# Patient Record
Sex: FEMALE | Race: WHITE
[De-identification: names, ages, dates, MRNs, and addresses within clinical notes are randomized per-mention and may not be internally consistent; named-entity substitution may affect disease eponyms.]

---

## 2017-03-03 ENCOUNTER — HOSPITAL ENCOUNTER (EMERGENCY)
Dept: HOSPITAL 62 - ER | Age: 72
Discharge: HOME | End: 2017-03-03
Payer: MEDICARE

## 2017-03-03 VITALS — DIASTOLIC BLOOD PRESSURE: 60 MMHG | SYSTOLIC BLOOD PRESSURE: 125 MMHG

## 2017-03-03 DIAGNOSIS — R74.8: ICD-10-CM

## 2017-03-03 DIAGNOSIS — Z90.710: ICD-10-CM

## 2017-03-03 DIAGNOSIS — R10.13: ICD-10-CM

## 2017-03-03 DIAGNOSIS — K08.9: ICD-10-CM

## 2017-03-03 DIAGNOSIS — K21.9: ICD-10-CM

## 2017-03-03 DIAGNOSIS — Z96.659: ICD-10-CM

## 2017-03-03 DIAGNOSIS — Z88.6: ICD-10-CM

## 2017-03-03 DIAGNOSIS — Z90.49: ICD-10-CM

## 2017-03-03 DIAGNOSIS — E11.9: ICD-10-CM

## 2017-03-03 DIAGNOSIS — R06.00: Primary | ICD-10-CM

## 2017-03-03 DIAGNOSIS — I10: ICD-10-CM

## 2017-03-03 LAB
ALBUMIN SERPL-MCNC: 4.5 G/DL (ref 3.5–5)
ALP SERPL-CCNC: 85 U/L (ref 38–126)
ALT SERPL-CCNC: 98 U/L (ref 9–52)
ANION GAP SERPL CALC-SCNC: 13 MMOL/L (ref 5–19)
APPEARANCE UR: (no result)
AST SERPL-CCNC: 219 U/L (ref 14–36)
BASOPHILS # BLD AUTO: 0 10^3/UL (ref 0–0.2)
BASOPHILS NFR BLD AUTO: 0.7 % (ref 0–2)
BILIRUB DIRECT SERPL-MCNC: 0 MG/DL (ref 0–0.3)
BILIRUB SERPL-MCNC: 1 MG/DL (ref 0.2–1.3)
BILIRUB UR QL STRIP: NEGATIVE
BUN SERPL-MCNC: 27 MG/DL (ref 7–20)
CALCIUM: 9.7 MG/DL (ref 8.4–10.2)
CHLORIDE SERPL-SCNC: 103 MMOL/L (ref 98–107)
CK MB SERPL-MCNC: 1.56 NG/ML (ref ?–4.55)
CK SERPL-CCNC: 74 U/L (ref 30–135)
CO2 SERPL-SCNC: 26 MMOL/L (ref 22–30)
CREAT SERPL-MCNC: 1.09 MG/DL (ref 0.52–1.25)
EOSINOPHIL # BLD AUTO: 0.1 10^3/UL (ref 0–0.6)
EOSINOPHIL NFR BLD AUTO: 1 % (ref 0–6)
ERYTHROCYTE [DISTWIDTH] IN BLOOD BY AUTOMATED COUNT: 13.4 % (ref 11.5–14)
GLUCOSE SERPL-MCNC: 204 MG/DL (ref 75–110)
GLUCOSE UR STRIP-MCNC: NEGATIVE MG/DL
HCT VFR BLD CALC: 42 % (ref 36–47)
HGB BLD-MCNC: 14 G/DL (ref 12–15.5)
HGB HCT DIFFERENCE: 0
KETONES UR STRIP-MCNC: NEGATIVE MG/DL
LYMPHOCYTES # BLD AUTO: 2.4 10^3/UL (ref 0.5–4.7)
LYMPHOCYTES NFR BLD AUTO: 33.2 % (ref 13–45)
MCH RBC QN AUTO: 30.9 PG (ref 27–33.4)
MCHC RBC AUTO-ENTMCNC: 33.3 G/DL (ref 32–36)
MCV RBC AUTO: 93 FL (ref 80–97)
MONOCYTES # BLD AUTO: 0.5 10^3/UL (ref 0.1–1.4)
MONOCYTES NFR BLD AUTO: 7.1 % (ref 3–13)
NEUTROPHILS # BLD AUTO: 4.2 10^3/UL (ref 1.7–8.2)
NEUTS SEG NFR BLD AUTO: 58 % (ref 42–78)
NITRITE UR QL STRIP: NEGATIVE
PH UR STRIP: 5 [PH] (ref 5–9)
POTASSIUM SERPL-SCNC: 4 MMOL/L (ref 3.6–5)
PROT SERPL-MCNC: 7.5 G/DL (ref 6.3–8.2)
PROT UR STRIP-MCNC: NEGATIVE MG/DL
RBC # BLD AUTO: 4.53 10^6/UL (ref 3.72–5.28)
SODIUM SERPL-SCNC: 141.8 MMOL/L (ref 137–145)
SP GR UR STRIP: 1.03
TROPONIN I SERPL-MCNC: < 0.012 NG/ML
UROBILINOGEN UR-MCNC: NEGATIVE MG/DL (ref ?–2)
WBC # BLD AUTO: 7.2 10^3/UL (ref 4–10.5)

## 2017-03-03 PROCEDURE — 80074 ACUTE HEPATITIS PANEL: CPT

## 2017-03-03 PROCEDURE — 71020: CPT

## 2017-03-03 PROCEDURE — 81001 URINALYSIS AUTO W/SCOPE: CPT

## 2017-03-03 PROCEDURE — 84484 ASSAY OF TROPONIN QUANT: CPT

## 2017-03-03 PROCEDURE — 82550 ASSAY OF CK (CPK): CPT

## 2017-03-03 PROCEDURE — 93010 ELECTROCARDIOGRAM REPORT: CPT

## 2017-03-03 PROCEDURE — 80053 COMPREHEN METABOLIC PANEL: CPT

## 2017-03-03 PROCEDURE — 83690 ASSAY OF LIPASE: CPT

## 2017-03-03 PROCEDURE — 85025 COMPLETE CBC W/AUTO DIFF WBC: CPT

## 2017-03-03 PROCEDURE — 93005 ELECTROCARDIOGRAM TRACING: CPT

## 2017-03-03 PROCEDURE — 36415 COLL VENOUS BLD VENIPUNCTURE: CPT

## 2017-03-03 PROCEDURE — 99285 EMERGENCY DEPT VISIT HI MDM: CPT

## 2017-03-03 PROCEDURE — 74177 CT ABD & PELVIS W/CONTRAST: CPT

## 2017-03-03 PROCEDURE — 82553 CREATINE MB FRACTION: CPT

## 2017-03-03 NOTE — ER DOCUMENT REPORT
ED Medical Screen (RME)





- General


Chief Complaint: Breathing Difficulty


Stated Complaint: DIFFICULTY BREATHING


Mode of Arrival: Ambulatory


Information source: Patient, Relative - daughter


Notes: 


Patient presents with her daughter for complaints of abdominal pain that shoots 

up into her chest makes her feel short of breath.  Patient reports she had took 

a Tylenol with Codeine and started having abdominal pain after that.  Patient 

denies chest pain.  Speaking in a clear voice.





I have greeted and performed a rapid initial assessment of this patient.  A 

comprehensive ED assessment and evaluation of the patient, analysis of test 

results and completion of the medical decision making process will be conducted 

by additional ED providers.


TRAVEL OUTSIDE OF THE [place] IN LAST 30 DAYS: No





- Related Data


Allergies/Adverse Reactions: 


 





acetaminophen [From Percocet] Allergy (Verified 02/08/12 11:46)


 


oxycodone HCl [From Percocet] Allergy (Verified 02/08/12 11:46)


 











Past Medical History





- Social History


Chew tobacco use (# tins/day): No


Frequency of alcohol use: None


Drug Abuse: None





- Past Medical History


Cardiac Medical History: Reports: Hx Hypertension


Endocrine Medical History: Reports: Hx Diabetes Mellitus Type 2


Renal/ Medical History: Denies: Hx Peritoneal Dialysis


GI Medical History: Reports: Hx Gastroesophageal Reflux Disease


Musculoskeltal Medical History: Reports Hx Arthritis


Past Surgical History: Reports: Hx Cholecystectomy, Hx Hysterectomy, Hx 

Orthopedic Surgery - knee replacement





- Immunizations


Hx Diphtheria, Pertussis, Tetanus Vaccination: Yes





Physical Exam





- Vital signs


Vitals: 





 











Pulse BP


 


 60   150/66 H


 


 03/03/17 01:17  03/03/17 01:17














Course





- Vital Signs


Vital signs: 





 











Temp Pulse Resp BP Pulse Ox


 


    60      150/66 H   


 


    03/03/17 01:17     03/03/17 01:17

## 2017-03-03 NOTE — ER DOCUMENT REPORT
ED General





- General


Chief Complaint: Breathing Difficulty


Stated Complaint: DIFFICULTY BREATHING


Mode of Arrival: Ambulatory


Information source: Patient, Relative - daughter


Notes: 


Pt. presents to the emergency department with complaints of  epigastric 

abdominal pain.  Patient reports she had a toothache last night and took 

Tylenol No. 3 around 2300.  She then laid down and started noticing abdominal 

distention.  She reports pressure against her diaphragm.  She has a history of 

acid reflux, she has not taken any type of antacid meds.  She reports the pain 

makes her feel short of breath.  She denies other symptoms such as fever 

vomiting diarrhea.  Patient's daughter at her side reports patient cannot take 

oxycodone without it giving her a stomachache.  She's not sure why her mother 

decided to take Tylenol with codeine.  She reports patient's  had a 

heart attack this past summer and they are all little anxious.  Patient denies 

chest pain.








TRAVEL OUTSIDE OF THE [place] IN LAST 30 DAYS: No





- HPI


Onset: Just prior to arrival


Onset/Duration: Persistent


Quality of pain: Achy


Pain Level: 1


Associated symptoms: None


Exacerbated by: Denies


Relieved by: Denies


Similar symptoms previously: No


Recently seen / treated by doctor: No





- Related Data


Allergies/Adverse Reactions: 


 





acetaminophen [From Percocet] Allergy (Verified 02/08/12 11:46)


 


oxycodone HCl [From Percocet] Allergy (Verified 02/08/12 11:46)


 











Past Medical History





- General


Information source: Patient, Relative - daughter





- Social History


Smoking Status: Never Smoker


Chew tobacco use (# tins/day): No


Frequency of alcohol use: None


Drug Abuse: None


Lives with: Family


Family History: Reviewed & Not Pertinent


Patient has suicidal ideation: No


Patient has homicidal ideation: No





- Past Medical History


Cardiac Medical History: Reports: Hx Hypertension


Endocrine Medical History: Reports: Hx Diabetes Mellitus Type 2


Renal/ Medical History: Denies: Hx Peritoneal Dialysis


GI Medical History: Reports: Hx Gastroesophageal Reflux Disease


Musculoskeltal Medical History: Reports Hx Arthritis


Past Surgical History: Reports: Hx Cholecystectomy, Hx Hysterectomy, Hx 

Orthopedic Surgery - knee replacement





- Immunizations


Hx Diphtheria, Pertussis, Tetanus Vaccination: Yes


Hx Pneumococcal Vaccination: 10/01/11





Review of Systems





- Review of Systems


Notes: 


Review HPI for review of systems., All other systems negative





Physical Exam





- Vital signs


Vitals: 


 











Pulse BP


 


 60   150/66 H


 


 03/03/17 01:17  03/03/17 01:17














- Notes


Notes: 


PHYSICAL EXAMINATION: 


GENERAL: Well-appearing and in no acute distress 


HEAD: Atraumatic, normocephalic. 


EYES: Pupils equal round and reactive to light, extraocular movements intact, 

sclera anicteric, conjunctiva are normal. 


ENT: nares patent, oropharynx clear without exudates. Moist mucous membranes. 


NECK: Normal range of motion, supple without lymphadenopathy 


LUNGS: CTAB and equal. No wheezes rales or rhonchi. 


HEART: Regular rate and rhythm without murmurs 


ABDOMEN: Soft, no tenderness. No guarding, no rebound 


EXTREMITIES: Normal range of motion, no pitting edema. No cyanosis. 


NEUROLOGICAL: Cranial nerves grossly intact. Normal sensory/motor exams. 


PSYCH: Normal mood, normal affect. 


SKIN: Warm, Dry, normal turgor, no rashes or lesions noted








Course





- Re-evaluation


Re-evalutation: 


03/03/17 03:33


Chest x-ray negative EKG sinus rhythm.





03/03/17 03:47


After talking with daughter and thinking patient may have a little bit of 

reflux I ordered a GI cocktail.  Patient was given the GI cocktail.  She 

reports no relief of symptoms.  She reports feels like she's having trouble 

getting a deep breath.  Patient reports that when the symptoms started it like 

it was coming up  her chest and she was having hard time breathing and she 

became very diaphoretic.  After discussing this with the patient I decided to 

initiate cardiac enzymes.  Daughter was instructed on why and time frame.





03/03/17 06:15


I have consulted  the attending provider dr rdz per [facility] guidelines, 

advised ct abd iv contrast. pt reports she takes extra strength tylenol every 

now and then for arthritis. she reports she only took one T#3.  





03/03/17 07:58


Reviewed CT scan results with Dr. Rdz and he agrees discharge patient 

follow up with primary care provider.  Patient  were instructed on 

results and given a copy of the lab reports and CT chest x-ray.  There 

instructed to follow up with Dr. Mckeon.  They verbalized understanding.  

Patient denies pain.  Denies nausea vomiting.  Patient reports she's tired and 

ready go home.





The patient presents with abdominal pain without signs of peritonitis or other 

life threatening or serious etiology.  The patient appears stable for discharge 

and has been instructed to return immediately if the symptoms worsen in any way 

or in 8-12 hours if not improved for reevaluation. The patient has been 

instructed to return if the symptoms worsen or change in any way.








- Vital Signs


Vital signs: 


 











Temp Pulse Resp BP Pulse Ox


 


    60   8 L  125/60   98 


 


    03/03/17 01:17  03/03/17 07:29  03/03/17 07:29  03/03/17 07:29














- Laboratory


Result Diagrams: 


 03/03/17 04:33





 03/03/17 04:33


Laboratory results interpreted by me: 


 











  03/03/17 03/03/17 03/03/17





  04:33 04:33 04:33


 


BUN  27 H  


 


Est GFR (Non-Af Amer)  49 L  


 


Glucose  204 H  


 


AST  219 H  


 


ALT  98 H  


 


Lipase    859.4 H


 


Urine Blood   SMALL H 


 


Ur Leukocyte Esterase   SMALL H 














- Diagnostic Test


Radiology reviewed: Image reviewed, Reports reviewed





- EKG Interpretation by Me


EKG shows normal: Sinus rhythm





Discharge





- Discharge


Clinical Impression: 


 Epigastric pain, Difficulty breathing, Elevated lipase, Elevated liver enzymes





Condition: Stable


Disposition: HOME, SELF-CARE


Instructions:  Abdominal Pain ([facility]), Liver Function Abnormality ([facility])


Additional Instructions: 


*You have been evaluated for difficulty breathing, abdominal pain, elevated 

lipase and liver enzymes


*Follow up with Dr Mckeon within one week- take your labs and CT report with 

you


*Return to ED for worsening condition, changes, needs, difficulty breathing, 

concerns


*Return to ED if not better in 24 hours





Forms:  Elevated Blood Pressure

## 2020-07-30 ENCOUNTER — HOSPITAL ENCOUNTER (OUTPATIENT)
Dept: HOSPITAL 62 - RAD | Age: 75
End: 2020-07-30
Attending: PHYSICIAN ASSISTANT
Payer: MEDICARE

## 2020-07-30 DIAGNOSIS — M25.531: Primary | ICD-10-CM

## 2020-07-30 NOTE — RADIOLOGY REPORT (SQ)
EXAM DESCRIPTION:  MRI RT UPPER JOINT WITHOUT



IMAGES COMPLETED DATE/TIME:  7/30/2020 10:39 am



REASON FOR STUDY:  (M25.531)PAIN IN RIGHT WRIST M25.531  PAIN IN RIGHT WRIST



COMPARISON:  None.



TECHNIQUE:  Right wrist images acquired and stored on PACS.  Multiplanar images include fat sensitive
 sequences as T1, fluid sensitive sequences as FST2/STIR, cartilage sensitive sequences as FSPD, grad
ient echo sequences.



LIMITATIONS:  None.



FINDINGS:  BONE MARROW: No alteration of signal to suggest marrow replacement or edema. No occult fra
cture. No large osteophytes.

CARPAL ALIGNMENT AND ARTICULATION: Intact.  Moderate arthropathy proximal and middle carpal rows.  Mo
derate arthropathy base of 1st metacarpal.

EFFUSION: None noted. No loose bodies.

SCAPHOLUNATE LIGAMENT: Intact without tear.

LUNATE-TRIQUETRAL LIGAMENT: Intact without tear.

TFC COMPLEX: Radial and ulnar attachments normal. Meniscus intact. Extensor carpi ulnaris tendon norm
al without tendinopathy.

EXTRINSIC LIGAMENTS AND DISTAL RADIO-ULNAR JOINT: Dorsal and volar distal RUJ intact without subluxat
ion of the distal ulna.

1-6 EXTENSOR COMPARTMENTS: Normal.

CARPAL TUNNEL AND MEDIAN NERVE: Prior release.  No evidence of postoperative complication.

OTHER: 2.5 x 7 mm AP by transverse diameter focus of fluid signal between the radial styloid and the 
flexor tendons.  Cyst versus small varix.



IMPRESSION:

1. Small cyst between the flexor tendons and radial styloid.  Varix considered less likely.

2. Prior carpal tunnel release.

3. Moderate degenerative changes.



TECHNICAL DOCUMENTATION:  JOB ID:  [id number]

 2011 [email or web address]- All Rights Reserved



Reading location - IP/workstation name: [email or web address]